# Patient Record
Sex: MALE | URBAN - METROPOLITAN AREA
[De-identification: names, ages, dates, MRNs, and addresses within clinical notes are randomized per-mention and may not be internally consistent; named-entity substitution may affect disease eponyms.]

---

## 2021-07-05 ENCOUNTER — HOSPITAL ENCOUNTER (EMERGENCY)
Facility: MEDICAL CENTER | Age: 41
End: 2021-07-05
Attending: EMERGENCY MEDICINE | Admitting: EMERGENCY MEDICINE

## 2021-07-05 VITALS
SYSTOLIC BLOOD PRESSURE: 179 MMHG | HEIGHT: 73 IN | RESPIRATION RATE: 18 BRPM | OXYGEN SATURATION: 92 % | HEART RATE: 76 BPM | BODY MASS INDEX: 28.66 KG/M2 | TEMPERATURE: 97.7 F | WEIGHT: 216.27 LBS | DIASTOLIC BLOOD PRESSURE: 99 MMHG

## 2021-07-05 DIAGNOSIS — L03.90 CELLULITIS, UNSPECIFIED CELLULITIS SITE: ICD-10-CM
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
## 2021-07-05 PROCEDURE — 99282 EMERGENCY DEPT VISIT SF MDM: CPT

## 2021-07-05 RX ORDER — AMOXICILLIN AND CLAVULANATE POTASSIUM 875; 125 MG/1; MG/1
1 TABLET, FILM COATED ORAL 2 TIMES DAILY
Qty: 14 TABLET | Refills: 0 | Status: SHIPPED | OUTPATIENT
Start: 2021-07-05 | End: 2021-07-12

## 2021-07-05 RX ORDER — AMLODIPINE BESYLATE 10 MG/1
10 TABLET ORAL DAILY
COMMUNITY

## 2021-07-05 ASSESSMENT — PAIN DESCRIPTION - PAIN TYPE: TYPE: ACUTE PAIN

## 2021-07-05 NOTE — ED NOTES
All DC discussed. Pt verbalized understanding of all DC instructions r/t animal bites & cellulitis, abx prescription, follow up recommendations and s/s to return to the ED for. Wounds cleaned and dressed. Pt ambulated to lobby with upright and steady gait.

## 2021-07-05 NOTE — ED PROVIDER NOTES
ED Provider Note    ER PROVIDER NOTE      CHIEF COMPLAINT  Chief Complaint   Patient presents with   • Dog Bite       HPI  Sterling Higuera Jr. is a 40 y.o. male who presents to the emergency department complaining of forearm redness.  Patient was bit by a dog yesterday morning, was a friend's vaccinated dog, and he reports increased redness to the area last night although seems slightly better today.  No fevers or chills, he does have some pain to the area, although no more proximal pain and new increased pain when he moves his wrist or hand. No other injury. Last tetanus 5 years.    REVIEW OF SYSTEMS  Pertinent positives include dog bite. Pertinent negatives include no fever. See HPI for details. All other systems reviewed and are negative.    PAST MEDICAL HISTORY       SURGICAL HISTORY  patient denies any surgical history    FAMILY HISTORY  History reviewed. No pertinent family history.    SOCIAL HISTORY  Social History     Socioeconomic History   • Marital status: Not on file     Spouse name: Not on file   • Number of children: Not on file   • Years of education: Not on file   • Highest education level: Not on file   Occupational History   • Not on file   Tobacco Use   • Smoking status: Current Every Day Smoker     Packs/day: 0.50     Types: Cigarettes   • Smokeless tobacco: Never Used   Substance and Sexual Activity   • Alcohol use: Yes     Comment: rarely   • Drug use: Yes     Types: Inhaled     Comment: marijuana   • Sexual activity: Not on file   Other Topics Concern   • Not on file   Social History Narrative   • Not on file     Social Determinants of Health     Financial Resource Strain:    • Difficulty of Paying Living Expenses:    Food Insecurity:    • Worried About Running Out of Food in the Last Year:    • Ran Out of Food in the Last Year:    Transportation Needs:    • Lack of Transportation (Medical):    • Lack of Transportation (Non-Medical):    Physical Activity:    • Days of Exercise per Week:   "  • Minutes of Exercise per Session:    Stress:    • Feeling of Stress :    Social Connections:    • Frequency of Communication with Friends and Family:    • Frequency of Social Gatherings with Friends and Family:    • Attends Congregation Services:    • Active Member of Clubs or Organizations:    • Attends Club or Organization Meetings:    • Marital Status:    Intimate Partner Violence:    • Fear of Current or Ex-Partner:    • Emotionally Abused:    • Physically Abused:    • Sexually Abused:       Social History     Substance and Sexual Activity   Drug Use Yes   • Types: Inhaled    Comment: marijuana       CURRENT MEDICATIONS  Home Medications     Reviewed by Margarita Bartholomew R.N. (Registered Nurse) on 07/05/21 at 1058  Med List Status: Not Addressed   Medication Last Dose Status   amLODIPine (NORVASC) 10 MG Tab 6/21/21 Active                ALLERGIES  Allergies   Allergen Reactions   • Lisinopril      Angioedema         PHYSICAL EXAM  VITAL SIGNS: BP (!) 193/124   Pulse 87   Temp 36.5 °C (97.7 °F) (Temporal)   Resp 18   Ht 1.854 m (6' 1\")   Wt 98.1 kg (216 lb 4.3 oz)   SpO2 95%   BMI 28.53 kg/m²   Pulse ox interpretation: I interpret this pulse ox as normal.    Constitutional: Alert in no apparent distress.  HENT: No signs of trauma, Bilateral external ears normal, Nose normal.   Eyes: Conjunctiva normal, Non-icteric.   Lymphatic: No axillary lymphadenopathy noted.   Cardiovascular: Regular rate and rhythm, no murmurs.   Thorax & Lungs: Normal breath sounds, No respiratory distress, No wheezing, No chest tenderness.   Abdomen: Bowel sounds normal, Soft, No tenderness, No masses, No pulsatile masses. No peritoneal signs.  Skin: Warm, Dry, 6 cm of erythema to the volar aspect of right forearm near the wrist  Extremities: Intact distal pulses,  No cyanosis  Musculoskeletal: Several small puncture wounds, slightly linear in appearance in the wrist on the right, no gaping wounds, no foreign bodies palpated, " erythema as above, there is no proximal streaking, no skin breakdown or sloughing.  No fluctuance or induration.  Patient is able to flex and extend at the wrist without any significant pain, makes okay sign, crosses digits 2 and 3, strong , good range of motion in all major joints. No tenderness to palpation or major deformities noted.   Neurologic: Alert , Normal motor function, Normal sensory function, No focal deficits noted.   Psychiatric: Affect normal, Judgment normal, Mood normal.     DIAGNOSTIC STUDIES / PROCEDURES      RADIOLOGY  No orders to display     The radiologist's interpretation of all radiological studies have been reviewed and images independently viewed by me.    COURSE & MEDICAL DECISION MAKING  Nursing notes, VS, PMSFHx reviewed in chart.    12:07 PM Patient seen and examined at bedside. Patient will be treated with wound care.     Decision Making:  This is a 40 y.o. male presented with dog bite over his right wrist.  Does have some findings of early cellulitis, will be started on Augmentin.  His tetanus is up-to-date.  And he was provided with wound care in the emergency department.  No findings at this time suggestive of abscess or deep space infection, does not have any real pain with passive flexion and extension to suggest compartment syndrome.  Tendons and neurovascular status appear intact.  I discussed home care as well as return precautions and will return here for recheck     The patient will return for new or worsening symptoms and is stable at the time of discharge.    The patient is referred to a primary physician for blood pressure management, diabetic screening, and for all other preventative health concerns.      DISPOSITION:  Patient will be discharged home in stable condition.    FOLLOW UP:  [facility], Emergency Dept  [address] [address] [phone]  In 3 days  For wound re-check      OUTPATIENT MEDICATIONS:  New Prescriptions     AMOXICILLIN-CLAVULANATE (AUGMENTIN) 875-125 MG TAB    Take 1 tablet by mouth 2 times a day for 7 days.         FINAL IMPRESSION  1. Dog bite, initial encounter    2. Cellulitis, unspecified cellulitis site          The note accurately reflects work and decisions made by me.  Galen Agrawal M.D.  7/5/2021  7:15 PM

## 2021-07-05 NOTE — ED TRIAGE NOTES
Sterling Higuera Jr.  40 y.o. male  Chief Complaint   Patient presents with   • Dog Bite     Patient states he was bitten by a domesticated dog yesterday around 0500. Patient has 4 puncture sites to right wrist, no active bleeding. Redness and swelling noted that extends to his right upper forearm.     Vitals:    07/05/21 1035   BP: (!) 180/108   Pulse: 90   Resp: 18   Temp: 36.5 °C (97.7 °F)   SpO2: 98%

## 2021-07-05 NOTE — ED NOTES
Per pt. Wad bit by dog around 0500, was able to cleanse wound w/ little bleeding. Pt has priot hx of dog bites and wanted to have wound cleansed and possible abx. CMS intact, pain w/ movement. Edema to R wrist w/ puncture sites present. Wounds clean, dry, and intact. Pt unknown when last tetanus shot was